# Patient Record
Sex: FEMALE | Race: WHITE
[De-identification: names, ages, dates, MRNs, and addresses within clinical notes are randomized per-mention and may not be internally consistent; named-entity substitution may affect disease eponyms.]

---

## 2020-03-24 ENCOUNTER — HOSPITAL ENCOUNTER (INPATIENT)
Dept: HOSPITAL 62 - ER | Age: 70
LOS: 4 days | Discharge: HOME | DRG: 310 | End: 2020-03-28
Attending: INTERNAL MEDICINE | Admitting: INTERNAL MEDICINE
Payer: MEDICARE

## 2020-03-24 DIAGNOSIS — Z79.01: ICD-10-CM

## 2020-03-24 DIAGNOSIS — K21.9: ICD-10-CM

## 2020-03-24 DIAGNOSIS — Z79.899: ICD-10-CM

## 2020-03-24 DIAGNOSIS — F41.0: ICD-10-CM

## 2020-03-24 DIAGNOSIS — Z84.89: ICD-10-CM

## 2020-03-24 DIAGNOSIS — F32.9: ICD-10-CM

## 2020-03-24 DIAGNOSIS — Z82.49: ICD-10-CM

## 2020-03-24 DIAGNOSIS — R11.0: ICD-10-CM

## 2020-03-24 DIAGNOSIS — Z87.891: ICD-10-CM

## 2020-03-24 DIAGNOSIS — I10: ICD-10-CM

## 2020-03-24 DIAGNOSIS — E78.5: ICD-10-CM

## 2020-03-24 DIAGNOSIS — I48.0: Primary | ICD-10-CM

## 2020-03-24 DIAGNOSIS — E05.90: ICD-10-CM

## 2020-03-24 DIAGNOSIS — F41.1: ICD-10-CM

## 2020-03-24 LAB
ADD MANUAL DIFF: NO
ALBUMIN SERPL-MCNC: 4.6 G/DL (ref 3.5–5)
ALP SERPL-CCNC: 79 U/L (ref 38–126)
ANION GAP SERPL CALC-SCNC: 13 MMOL/L (ref 5–19)
APPEARANCE UR: CLEAR
APTT PPP: (no result) S
AST SERPL-CCNC: 24 U/L (ref 14–36)
BASOPHILS # BLD AUTO: 0 10^3/UL (ref 0–0.2)
BASOPHILS NFR BLD AUTO: 0.5 % (ref 0–2)
BILIRUB DIRECT SERPL-MCNC: 0.2 MG/DL (ref 0–0.4)
BILIRUB SERPL-MCNC: 0.5 MG/DL (ref 0.2–1.3)
BILIRUB UR QL STRIP: NEGATIVE
BUN SERPL-MCNC: 7 MG/DL (ref 7–20)
CALCIUM: 10.1 MG/DL (ref 8.4–10.2)
CHLORIDE SERPL-SCNC: 98 MMOL/L (ref 98–107)
CK MB SERPL-MCNC: 1.11 NG/ML (ref ?–4.55)
CK SERPL-CCNC: 59 U/L (ref 30–135)
CO2 SERPL-SCNC: 25 MMOL/L (ref 22–30)
EOSINOPHIL # BLD AUTO: 0.1 10^3/UL (ref 0–0.6)
EOSINOPHIL NFR BLD AUTO: 1.4 % (ref 0–6)
ERYTHROCYTE [DISTWIDTH] IN BLOOD BY AUTOMATED COUNT: 14.1 % (ref 11.5–14)
GLUCOSE SERPL-MCNC: 137 MG/DL (ref 75–110)
GLUCOSE UR STRIP-MCNC: NEGATIVE MG/DL
HCT VFR BLD CALC: 43.6 % (ref 36–47)
HGB BLD-MCNC: 14.9 G/DL (ref 12–15.5)
INR PPP: 0.95
KETONES UR STRIP-MCNC: 20 MG/DL
LYMPHOCYTES # BLD AUTO: 3.2 10^3/UL (ref 0.5–4.7)
LYMPHOCYTES NFR BLD AUTO: 34.4 % (ref 13–45)
MCH RBC QN AUTO: 32.2 PG (ref 27–33.4)
MCHC RBC AUTO-ENTMCNC: 34.1 G/DL (ref 32–36)
MCV RBC AUTO: 95 FL (ref 80–97)
MONOCYTES # BLD AUTO: 0.6 10^3/UL (ref 0.1–1.4)
MONOCYTES NFR BLD AUTO: 6.9 % (ref 3–13)
NEUTROPHILS # BLD AUTO: 5.3 10^3/UL (ref 1.7–8.2)
NEUTS SEG NFR BLD AUTO: 56.8 % (ref 42–78)
NITRITE UR QL STRIP: NEGATIVE
NT PRO BNP: 1290 PG/ML (ref ?–125)
PH UR STRIP: 7 [PH] (ref 5–9)
PLATELET # BLD: 363 10^3/UL (ref 150–450)
POTASSIUM SERPL-SCNC: 4.1 MMOL/L (ref 3.6–5)
PROT SERPL-MCNC: 8 G/DL (ref 6.3–8.2)
PROT UR STRIP-MCNC: NEGATIVE MG/DL
PROTHROMBIN TIME: 12.7 SEC (ref 11.4–15.4)
RBC # BLD AUTO: 4.61 10^6/UL (ref 3.72–5.28)
SP GR UR STRIP: 1
TOTAL CELLS COUNTED % (AUTO): 100 %
TROPONIN I SERPL-MCNC: 0.01 NG/ML
UROBILINOGEN UR-MCNC: NEGATIVE MG/DL (ref ?–2)
WBC # BLD AUTO: 9.3 10^3/UL (ref 4–10.5)

## 2020-03-24 PROCEDURE — 82550 ASSAY OF CK (CPK): CPT

## 2020-03-24 PROCEDURE — 84439 ASSAY OF FREE THYROXINE: CPT

## 2020-03-24 PROCEDURE — 99292 CRITICAL CARE ADDL 30 MIN: CPT

## 2020-03-24 PROCEDURE — 84481 FREE ASSAY (FT-3): CPT

## 2020-03-24 PROCEDURE — 36415 COLL VENOUS BLD VENIPUNCTURE: CPT

## 2020-03-24 PROCEDURE — G0378 HOSPITAL OBSERVATION PER HR: HCPCS

## 2020-03-24 PROCEDURE — 83880 ASSAY OF NATRIURETIC PEPTIDE: CPT

## 2020-03-24 PROCEDURE — 93306 TTE W/DOPPLER COMPLETE: CPT

## 2020-03-24 PROCEDURE — 84484 ASSAY OF TROPONIN QUANT: CPT

## 2020-03-24 PROCEDURE — 93010 ELECTROCARDIOGRAM REPORT: CPT

## 2020-03-24 PROCEDURE — 82553 CREATINE MB FRACTION: CPT

## 2020-03-24 PROCEDURE — 80053 COMPREHEN METABOLIC PANEL: CPT

## 2020-03-24 PROCEDURE — 71045 X-RAY EXAM CHEST 1 VIEW: CPT

## 2020-03-24 PROCEDURE — 84443 ASSAY THYROID STIM HORMONE: CPT

## 2020-03-24 PROCEDURE — 99291 CRITICAL CARE FIRST HOUR: CPT

## 2020-03-24 PROCEDURE — 85025 COMPLETE CBC W/AUTO DIFF WBC: CPT

## 2020-03-24 PROCEDURE — 83735 ASSAY OF MAGNESIUM: CPT

## 2020-03-24 PROCEDURE — 96375 TX/PRO/DX INJ NEW DRUG ADDON: CPT

## 2020-03-24 PROCEDURE — 93005 ELECTROCARDIOGRAM TRACING: CPT

## 2020-03-24 PROCEDURE — 96365 THER/PROPH/DIAG IV INF INIT: CPT

## 2020-03-24 PROCEDURE — 81001 URINALYSIS AUTO W/SCOPE: CPT

## 2020-03-24 PROCEDURE — 85610 PROTHROMBIN TIME: CPT

## 2020-03-24 PROCEDURE — 96376 TX/PRO/DX INJ SAME DRUG ADON: CPT

## 2020-03-24 RX ADMIN — Medication ONE MLS/HR: at 20:32

## 2020-03-24 RX ADMIN — Medication ONE: at 20:48

## 2020-03-24 RX ADMIN — SODIUM CHLORIDE PRN MLS/HR: 9 INJECTION, SOLUTION INTRAVENOUS at 21:04

## 2020-03-24 RX ADMIN — HEPARIN SODIUM SCH UNIT: 5000 INJECTION, SOLUTION INTRAVENOUS; SUBCUTANEOUS at 22:10

## 2020-03-24 RX ADMIN — Medication PRN MLS/HR: at 22:25

## 2020-03-24 RX ADMIN — SODIUM CHLORIDE PRN MLS/HR: 9 INJECTION, SOLUTION INTRAVENOUS at 22:10

## 2020-03-24 RX ADMIN — Medication ONE MLS/HR: at 20:35

## 2020-03-24 RX ADMIN — Medication ONE: at 20:34

## 2020-03-24 RX ADMIN — METOPROLOL TARTRATE PRN MG: 5 INJECTION, SOLUTION INTRAVENOUS at 22:10

## 2020-03-24 NOTE — RADIOLOGY REPORT (SQ)
EXAM DESCRIPTION:

X-RAY CHEST- One View



CLINICAL HISTORY:

Chest pain



COMPARISON:

None available



TECHNIQUE:

Single view of the chest.



FINDINGS:

There are overlying EKG leads and other objects.

Lungs appear hyperexpanded with flattening of the diaphragms.

Nodular appearing density with potential peripheral calcification

projects over the right upper lobe measuring up to 1 cm in size.

There are no discrete pneumothoraces or pleural effusions. 



The pulmonary vascularity is normal.

The cardiomediastinal silhouette is normal in size.



No suspicious lytic or blastic osseous lesions are identified.



IMPRESSION:

1. Hyperexpanded appearance is nonspecific but can be seen in the

setting of COPD.

2. Nodular appearing lesion projects over the right upper lobe.

Findings are nonspecific and attention on follow-up is

recommended.

## 2020-03-24 NOTE — ER DOCUMENT REPORT
ED General





- General


Chief Complaint: Palpitations


Stated Complaint: CHEST PAIN,RAPID HEART BEAT,DIZZINESS


Time Seen by Provider: 03/24/20 19:57


Primary Care Provider: 


AVRIL LIM [NO LOCAL MD] - Follow up as needed


Mode of Arrival: Ambulatory


Information source: Patient


Notes: 





69-year-old  female arrives by POV with her  Doug with chief 

complaint of having onset of fast heart rate since 0800 this morning.  Patient 

was doing normal activities when this occurred.  Patient has a history of MVP 

and on atenolol per Dr. Restrepo and had surgery to her right back and neck from a

cervical rib resection in 1980.  Patient lives in [facility].  She 

has never had any similar symptoms in the past.  She has a history of some 

stomach pain when she takes aspirin and sulfa drugs and codeine.  She does not 

have allergic reactions when she takes these medicines.  Patient has a history 

of ischemic colitis in the past.  But no surgeries.  I spoke with Dr. Jaquez 

after the patient was given adenosine 6 then 12 mg and then Cardizem 20 mg bolus

with a 5 mg/h drip .


TRAVEL OUTSIDE OF THE [place] IN LAST 30 DAYS: No





- HPI


Onset: This morning


Onset/Duration: Sudden


Quality of pain: No pain


Severity: None


Pain Level: Denies


Associated symptoms: Chest pain, Weakness


Exacerbated by: Denies


Relieved by: Denies


Similar symptoms previously: Yes


Recently seen / treated by doctor: No





- Related Data


Allergies/Adverse Reactions: 


                                        





aspirin [Aspirin] Adverse Reaction (Severe, Verified 03/31/19 07:14)


   ABDOMINAL PAIN


codeine [Codeine] Adverse Reaction (Severe, Verified 03/31/19 07:14)


   ABDOMINAL PAIN


sulfamethoxazole [From Septra] Adverse Reaction (Severe, Verified 03/31/19 

07:14)


   ABDOMINAL PAIN


trimethoprim [From Septra] Adverse Reaction (Severe, Verified 03/31/19 07:14)


   ABDOMINAL PAIN








Home Medications: atenolol





Past Medical History





- Social History


Smoking Status: Unknown if Ever Smoked


Cigarette use (# per day): No


Chew tobacco use (# tins/day): No


Smoking Education Provided: No


Frequency of alcohol use: None


Drug Abuse: None


Lives with: Family -  Doug


Family History: Reviewed & Not Pertinent


Patient has suicidal ideation: No


Patient has homicidal ideation: No





- Past Medical History


Cardiac Medical History: Reports: Hx Hypercholesterolemia, Hx Hypertension


   Denies: Hx Coronary Artery Disease, Hx Heart Attack


Pulmonary Medical History: 


   Denies: Hx Asthma, Hx Bronchitis, Hx COPD, Hx Pneumonia


Neurological Medical History: Denies: Hx Cerebrovascular Accident, Hx Seizures


Renal/ Medical History: Denies: Hx Peritoneal Dialysis


GI Medical History: Reports: Hx Gastroesophageal Reflux Disease


Musculoskeletal Medical History: Denies Hx Arthritis


Past Surgical History: Reports: Hx Hysterectomy





- Immunizations


Hx Diphtheria, Pertussis, Tetanus Vaccination: Yes





Review of Systems





- Review of Systems


Constitutional: See HPI, Malaise, Weakness


EENT: No symptoms reported


Cardiovascular: See HPI, Chest pain, Palpitations


Respiratory: No symptoms reported


Gastrointestinal: No symptoms reported


Genitourinary: No symptoms reported


Female Genitourinary: No symptoms reported


Musculoskeletal: No symptoms reported


Skin: No symptoms reported


Hematologic/Lymphatic: No symptoms reported


Neurological/Psychological: No symptoms reported





Physical Exam





- Vital signs


Vitals: 





                                        











Resp Pulse Ox


 


 21 H  100 


 


 03/24/20 19:51  03/24/20 19:51











Interpretation: Hypertensive, Tachycardic





- General


General appearance: Alert





- HEENT


Head: Normocephalic


Eyes: Normal


Conjunctiva: Normal


Cornea: Normal


Extraocular movements intact: Yes


Eyelashes: Normal


Pupils: PERRL


Pharynx: Normal


Neck: Normal





- Respiratory


Respiratory status: No respiratory distress


Chest status: Nontender


Breath sounds: Normal


Chest palpation: Normal





- Cardiovascular


Rhythm: Tachycardia


Heart sounds: Normal auscultation


Murmur: No


Friction rub: No


Hamman's crunch: No





- Abdominal


Inspection: Normal


Distension: No distension


Bowel sounds: Normal


Tenderness: Nontender


Organomegaly: No organomegaly





- Back


Back: Normal





- Extremities


General upper extremity: Normal inspection


General lower extremity: Normal inspection





- Neurological


Neuro grossly intact: Yes


Cognition: Normal


Orientation: AAOx4


Nate Coma Scale Eye Opening: Spontaneous


Alger Coma Scale Verbal: Oriented


Alger Coma Scale Motor: Obeys Commands


Alger Coma Scale Total: 15


Speech: Normal


Cranial nerves: Normal


Cerebellar coordination: Normal


Motor strength normal: LUE, RUE, LLE, RLE





- Psychological


Associated symptoms: Normal affect





- Skin


Skin Temperature: Warm


Skin Moisture: Dry





Course





- Vital Signs


Vital signs: 





                                        











Temp Pulse Resp BP Pulse Ox


 


 98.5 F      25 H  153/107 H  99 


 


 03/24/20 19:52     03/24/20 19:52  03/24/20 19:52  03/24/20 19:52














- Laboratory


Result Diagrams: 


                                 03/24/20 19:53





                                 03/24/20 19:53


Laboratory results interpreted by me: 





                                        











  03/24/20 03/24/20 03/24/20





  19:53 19:53 19:53


 


RDW  14.1 H  


 


Sodium   135.8 L 


 


Glucose   137 H 


 


NT-Pro-B Natriuret Pep    1290 H














- Diagnostic Test


Radiology reviewed: Reports reviewed





- EKG Interpretation by Me


Rate: Tachycardia


Rhythm: SVT





Critical Care Note





- Critical Care Note


Total time excluding time spent on procedures (mins): 90


Comments: 





I discussed this case with [facility] and he will admit patient I 

also spoke with Dr. Jaquez prior to this time and he advised admitting the 

patient and he will see the patient tomorrow morning.





Discharge





- Discharge


Clinical Impression: 


 Tachycardia





Condition: Good


Disposition: ADMITTED AS INPATIENT


Unit Admitted: ICU


Additional Instructions: 


Transfer patient to ICU


Referrals: 


AVRIL LIM [NO LOCAL MD] - Follow up as needed

## 2020-03-25 LAB
FREE T4 (FREE THYROXINE): 1.08 NG/DL (ref 0.78–2.19)
T3FREE SERPL-MCNC: 5.24 PG/ML (ref 2.77–5.27)

## 2020-03-25 RX ADMIN — BUSPIRONE HYDROCHLORIDE SCH: 10 TABLET ORAL at 14:35

## 2020-03-25 RX ADMIN — BUSPIRONE HYDROCHLORIDE SCH MG: 10 TABLET ORAL at 14:07

## 2020-03-25 RX ADMIN — HEPARIN SODIUM SCH UNIT: 5000 INJECTION, SOLUTION INTRAVENOUS; SUBCUTANEOUS at 06:18

## 2020-03-25 RX ADMIN — DILTIAZEM HYDROCHLORIDE SCH MG: 60 TABLET, FILM COATED ORAL at 12:27

## 2020-03-25 RX ADMIN — Medication PRN MLS/HR: at 14:10

## 2020-03-25 RX ADMIN — APIXABAN SCH MG: 5 TABLET, FILM COATED ORAL at 18:05

## 2020-03-25 RX ADMIN — BUSPIRONE HYDROCHLORIDE SCH MG: 10 TABLET ORAL at 14:39

## 2020-03-25 RX ADMIN — DILTIAZEM HYDROCHLORIDE SCH MG: 60 TABLET, FILM COATED ORAL at 18:06

## 2020-03-25 RX ADMIN — Medication PRN MLS/HR: at 22:47

## 2020-03-25 RX ADMIN — DILTIAZEM HYDROCHLORIDE SCH MG: 60 TABLET, FILM COATED ORAL at 23:11

## 2020-03-25 RX ADMIN — ZOLPIDEM TARTRATE PRN MG: 5 TABLET ORAL at 22:40

## 2020-03-25 RX ADMIN — DILTIAZEM HYDROCHLORIDE SCH MG: 60 TABLET, FILM COATED ORAL at 08:56

## 2020-03-25 RX ADMIN — METOPROLOL TARTRATE SCH MG: 25 TABLET, FILM COATED ORAL at 14:06

## 2020-03-25 RX ADMIN — Medication PRN MLS/HR: at 04:14

## 2020-03-25 RX ADMIN — BUSPIRONE HYDROCHLORIDE SCH MG: 10 TABLET ORAL at 22:40

## 2020-03-25 RX ADMIN — DEXAMETHASONE SODIUM PHOSPHATE PRN MG: 10 INJECTION INTRAMUSCULAR; INTRAVENOUS at 18:10

## 2020-03-25 RX ADMIN — METOPROLOL TARTRATE PRN MG: 5 INJECTION, SOLUTION INTRAVENOUS at 20:39

## 2020-03-25 RX ADMIN — METOPROLOL TARTRATE SCH MG: 25 TABLET, FILM COATED ORAL at 22:40

## 2020-03-25 RX ADMIN — BUTALBITAL, ACETAMINOPHEN AND CAFFEINE PRN TAB: 50; 325; 40 TABLET ORAL at 18:10

## 2020-03-25 RX ADMIN — DEXAMETHASONE SODIUM PHOSPHATE PRN MG: 10 INJECTION INTRAMUSCULAR; INTRAVENOUS at 10:28

## 2020-03-25 RX ADMIN — BUTALBITAL, ACETAMINOPHEN AND CAFFEINE PRN TAB: 50; 325; 40 TABLET ORAL at 10:28

## 2020-03-25 NOTE — EKG REPORT
SEVERITY:- ABNORMAL ECG -

ATRIAL FIBRILLATION

PVC

LEFT AXIS DEVIATION

:

Confirmed by: Santino Singh MD 25-Mar-2020 11:25:05

## 2020-03-25 NOTE — EKG REPORT
SEVERITY:- ABNORMAL ECG -

ATRIAL FIBRILLATION WITH RAPID V-RATE

INFERIOR INFARCT, OLD

REPOLARIZATION ABNORMALITY, PROB RATE RELATED

:

Confirmed by: Santino Singh MD 25-Mar-2020 11:26:17

## 2020-03-25 NOTE — PDOC H&P
History of Present Illness


Admission Date/PCP: 


  20 21:25





  





Patient complains of: Palpitations


History of Present Illness: 


YOANA VILLA is a 69 year old female with a past medical history of mitral valve

prolapse and intermittent tachycardia on atenolol.  She presents with 12 hours 

of palpitations prompting evaluation in the emergency department.  She is found 

to have sinus tachycardia in the 1 40-1 80 range, after adenosine 6 followed by 

12 she was placed on IV Cardizem.  And referred to the hospitalist for 

admission.  She adamantly denies recent change in medications, excessive 

caffeine, energy drinks, over-the-counter medications or abrupt medication 

withdrawal.  She does admit to recent onset of panic attack first being 2 weeks 

ago prior to dental procedure.  She admits to panic this morning concern for 

repeat dental procedure and ongoing public health concerns.   She also has a 

history of thyroid nodules requiring drainage 2 years ago.  After receiving 

continuous IV Cardizem followed by IV labetalol and Lopressor without 

significant improvement she receives IV Ativan greatly improving her heart rate 

into the 90s.  She denies chest pain nausea vomiting, heat or cold intolerance.





Past Medical History


Cardiac Medical History: Reports: Hyperlipidema, Hypertension


   Denies: Coronary Artery Disease, Myocardial Infarction


Pulmonary Medical History: 


   Denies: Asthma, Bronchitis, Chronic Obstructive Pulmonary Disease (COPD), 

Pneumonia


Neurological Medical History: 


   Denies: Seizures


Endocrine Medical History: Reports: Hyperthyroidism


GI Medical History: Reports: Gastroesophageal Reflux Disease


Musculoskeltal Medical History: 


   Denies: Arthritis


Psychiatric Medical History: Reports: Depression, General Anxiety Disorder


Hematology: 


   Denies: Anemia





Past Surgical History


Past Surgical History: Reports: Hysterectomy





Social History


Information Source: Patient


Lives with: Family -  Doug


Smoking Status: Former Smoker


Cigarettes Packs Per Day: 0.5


Number of Years Smokin


Last Time Smoked: 


Frequency of Alcohol Use: None


Hx Recreational Drug Use: No


Drugs: None


Hx Prescription Drug Abuse: No





- Advance Directive


Resuscitation Status: Full Code





Family History


Family History: Hypertension, Thyroid Disfunction


Parental Family History Reviewed: Yes


Children Family History Reviewed: Yes


Sibling(s) Family History Reviewed.: Yes





Medication/Allergy


Home Medications: 








Atenolol [Tenormin 25 mg Tablet] 25 mg PO DAILY 13 


Butalb/Acetaminophen/Caffeine [Fioricet (-40 mg) Tablet] 1 tab PO BID 

13 


Zolpidem Tartrate [Ambien 10 mg Tablet] 10 mg PO PRN PRN 13 


Ondansetron [Zofran Odt 4 mg Tablet] 1 - 2 tab PO Q4H PRN #15 tab.rapdis 

19 


Pravastatin Sodium 20 mg PO 20 








Allergies/Adverse Reactions: 


                                        





aspirin [Aspirin] Adverse Reaction (Severe, Verified 19 07:14)


   ABDOMINAL PAIN


codeine [Codeine] Adverse Reaction (Severe, Verified 19 07:14)


   ABDOMINAL PAIN


sulfamethoxazole [From Septra] Adverse Reaction (Severe, Verified 19 

07:14)


   ABDOMINAL PAIN


trimethoprim [From Septra] Adverse Reaction (Severe, Verified 19 07:14)


   ABDOMINAL PAIN











Review of Systems


Constitutional: ABSENT: chills, fever(s), headache(s), weight gain, weight loss


Eyes: ABSENT: visual disturbances


Ears: ABSENT: hearing changes


Cardiovascular: ABSENT: chest pain, dyspnea on exertion, edema, orthropnea, 

palpitations


Respiratory: ABSENT: cough, hemoptysis


Gastrointestinal: ABSENT: abdominal pain, constipation, diarrhea, hematemesis, 

hematochezia, nausea, vomiting


Genitourinary: ABSENT: dysuria, hematuria


Musculoskeletal: ABSENT: joint swelling


Integumentary: ABSENT: rash, wounds


Neurological: ABSENT: abnormal gait, abnormal speech, confusion, dizziness, 

focal weakness, syncope


Psychiatric: ABSENT: anxiety, depression, homidical ideation, suicidal ideation


Endocrine: ABSENT: cold intolerance, heat intolerance, polydipsia, polyuria


Hematologic/Lymphatic: ABSENT: easy bleeding, easy bruising





Physical Exam


Vital Signs: 


                                        











Temp Pulse Resp BP Pulse Ox


 


 98 F   118 H  18   115/71   99 


 


 20 23:56  20 23:56  20 23:56  20 02:00  20 23:56








                                 Intake & Output











 20





 11:59 11:59 11:59


 


Intake Total   


 


Balance   


 


Weight   60.5 kg











General appearance: PRESENT: no acute distress, well-developed, well-nourished


Head exam: PRESENT: atraumatic, normocephalic


Eye exam: PRESENT: conjunctiva pink, EOMI, PERRLA.  ABSENT: scleral icterus


Ear exam: PRESENT: normal external ear exam


Mouth exam: PRESENT: moist, tongue midline


Neck exam: ABSENT: carotid bruit, JVD, lymphadenopathy, thyromegaly


Respiratory exam: PRESENT: clear to auscultation geovanni.  ABSENT: rales, rhonchi, 

wheezes


Cardiovascular exam: PRESENT: irregular rhythm, tachycardia.  ABSENT: diastolic 

murmur, rubs, systolic murmur


Pulses: PRESENT: normal dorsalis pedis pul


Vascular exam: PRESENT: normal capillary refill


GI/Abdominal exam: PRESENT: normal bowel sounds, soft.  ABSENT: distended, 

guarding, mass, organolmegaly, rebound, tenderness


Rectal exam: PRESENT: deferred


Extremities exam: PRESENT: full ROM.  ABSENT: calf tenderness, clubbing, pedal 

edema


Neurological exam: PRESENT: alert, awake, oriented to person, oriented to place,

oriented to time, oriented to situation, CN II-XII grossly intact.  ABSENT: 

motor sensory deficit


Psychiatric exam: PRESENT: appropriate affect, normal mood.  ABSENT: homicidal 

ideation, suicidal ideation


Skin exam: PRESENT: dry, intact, warm.  ABSENT: cyanosis, rash





Results


Laboratory Results: 


                                        





                                 20 19:53 





                                 20 19:53 





                                        











  20





  19:53 19:53 19:53


 


WBC  9.3  


 


RBC  4.61  


 


Hgb  14.9  


 


Hct  43.6  


 


MCV  95  


 


MCH  32.2  


 


MCHC  34.1  


 


RDW  14.1 H  


 


Plt Count  363  


 


Seg Neutrophils %  56.8  


 


Sodium   135.8 L 


 


Potassium   4.1 


 


Chloride   98 


 


Carbon Dioxide   25 


 


Anion Gap   13 


 


BUN   7 


 


Creatinine   0.72 


 


Est GFR ( Amer)   > 60 


 


Glucose   137 H 


 


Calcium   10.1 


 


Magnesium    2.1


 


Total Bilirubin   0.5 


 


AST   24 


 


Alkaline Phosphatase   79 


 


Total Protein   8.0 


 


Albumin   4.6 


 


TSH   


 


Urine Color   


 


Urine Appearance   


 


Urine pH   


 


Ur Specific Gravity   


 


Urine Protein   


 


Urine Glucose (UA)   


 


Urine Ketones   


 


Urine Blood   


 


Urine Nitrite   


 


Ur Leukocyte Esterase   


 


Urine WBC (Auto)   














  20





  19:53 21:19


 


WBC  


 


RBC  


 


Hgb  


 


Hct  


 


MCV  


 


MCH  


 


MCHC  


 


RDW  


 


Plt Count  


 


Seg Neutrophils %  


 


Sodium  


 


Potassium  


 


Chloride  


 


Carbon Dioxide  


 


Anion Gap  


 


BUN  


 


Creatinine  


 


Est GFR (African Amer)  


 


Glucose  


 


Calcium  


 


Magnesium  


 


Total Bilirubin  


 


AST  


 


Alkaline Phosphatase  


 


Total Protein  


 


Albumin  


 


TSH  0.53 


 


Urine Color   STRAW


 


Urine Appearance   CLEAR


 


Urine pH   7.0


 


Ur Specific Gravity   1.004


 


Urine Protein   NEGATIVE


 


Urine Glucose (UA)   NEGATIVE


 


Urine Ketones   20 H


 


Urine Blood   NEGATIVE


 


Urine Nitrite   NEGATIVE


 


Ur Leukocyte Esterase   NEGATIVE


 


Urine WBC (Auto)   1








                                        











  20





  19:53 19:53


 


Creatine Kinase  59 


 


CK-MB (CK-2)   1.11


 


Troponin I   0.014


 


NT-Pro-B Natriuret Pep   1290 H











Impressions: 


                                        





Chest X-Ray  20 19:59


IMPRESSION:


1. Hyperexpanded appearance is nonspecific but can be seen in the


setting of COPD.


2. Nodular appearing lesion projects over the right upper lobe.


Findings are nonspecific and attention on follow-up is


recommended.


 














Assessment and Plan





- Diagnosis


(1) A-fib


Qualifiers: 


   Atrial fibrillation type: paroxysmal   Qualified Code(s): I48.0 - Paroxysmal 

atrial fibrillation   


Is this a current diagnosis for this admission?: Yes   


Plan: 


[facility], IV Cardizem, beta-blocker as tolerated.  Possibly secondary to 

hypothyroidism, full dose anticoagulation, follow-up cardiology consult and TSH








(2) Hyperthyroidism


Is this a current diagnosis for this admission?: Yes   


Plan: 


Unclear history, follow-up TSH and free T4








(3) Panic


Is this a current diagnosis for this admission?: Yes   


Plan: 


Second episode in 2 weeks, possibly secondary to hyperthyroidism, symptomatic 

management, beta-blocker ordered.








- Time


Time Spent with patient: 25-34 minutes





- Inpatient Certification


Medical Necessity: Need Close Monitoring Due to Risk of Patient Decompensation

## 2020-03-25 NOTE — XCELERA REPORT
[address] [address]

                               Tel: [phone]

                               Fax: [phone]



                      Transthoracic Echocardiogram Report

_______________________________________________________________________________



Name: YOANA VILLA

MRN: [id number]                           Age: 69 yrs

Gender: Female                            : 1950

Patient Status: Inpatient                 Patient Location: [address] [place], [place] [address]

Account #: [id number]

Study Date: 2020 07:12 AM

History: Atrial fibrillation

Accession #: [id number]

_______________________________________________________________________________



Height: 67 in        Weight: 140 lb        BSA: 1.7 m2

_______________________________________________________________________________

Procedure: A complete two-dimensional transthoracic echocardiogram was

performed (2D, M-mode, spectral and color flow Doppler). The study was

technically adequate with some images being suboptimal in quality.

Reason For Study: new onset afib (marcin stephanie)

Previous Evaluation: No previous studies were available.

History: Atrial fibrillation.



Ordering Physician: ADÁN AGRAWAL

Performed By: Lisa Mejia

_______________________________________________________________________________



Interpretation Summary

The Ejection Fraction estimate is 60-65%

Left ventricular systolic function is normal.

The right ventricle is normal in size and function.

There is a trace amount of mitral regurgitation

There is no aortic valve stenosis

There is a mild amount of tricuspid regurgitation

There is no pericardial effusion.



MMode/2D Measurements & Calculations



RVDd: 2.2 cm        LVIDd: 3.7 cm      FS: 28.5 %         Ao root diam: 2.8 cm

IVSd: 1.5 cm        LVIDs: 2.6 cm      EDV(Teich):        Ao root area:

                    LVPWd: 0.92 cm     56.8 ml            6.3 cm2

                                       ESV(Teich):

                                       25.1 ml

                                       EF(Teich): 55.9 %

        _______________________________________________________________

EDV(MOD-sp4):       SV(MOD-sp4):

32.5 ml             21.3 ml

ESV(MOD-sp4):

11.3 ml

EF(MOD-sp4): 65.4 %



Doppler Measurements & Calculations

MV E max aashish:       MV dec slope:        Ao V2 max:         LV V1 max P.9 cm/sec                              185.5 cm/sec       3.6 mmHg

                    370.2 cm/sec2        Ao max PG:         LV V1 max:

                    MV dec time: 0.23 sec13.8 mmHg          94.5 cm/sec



        _______________________________________________________________

PA V2 max:          TR max aashish:

95.6 cm/sec         263.1 cm/sec

PA max PG: 3.7 mmHg TR max P.7 mmHg



Left Ventricle

The left ventricle is grossly normal size. There is normal left ventricular

wall thickness. The Ejection Fraction estimate is 60-65%. Left ventricular

systolic function is normal. No regional wall motion abnormalities noted.



Right Ventricle

The right ventricle is normal in size and function.





Atria

The right atrium is normal. The left atrial size is normal.



Mitral Valve

The mitral valve leaflets appear normal. There is no evidence of stenosis,

fluttering, or prolapse. There is no evidence of mitral valve prolapse. There

is a trace amount of mitral regurgitation.



Aortic Valve

The aortic valve opens well. The aortic valve is trileaflet. The aortic valve

is normal in structure and function. There is no aortic valve stenosis. No

aortic regurgitation is present.



Tricuspid Valve

The tricuspid valve is normal in structure and function. There is a mild

amount of tricuspid regurgitation. Doppler findings do not suggest pulmonary

hypertension.





Pulmonic Valve

The pulmonic valve is normal in structure and function. There is a trace

amount of pulmonic regurgitation.



Effusions

There is no pericardial effusion.



_______________________________________________________________________________



_______________________________________________________________________________

Electronically signed by:      Marcin Jaquez      on 2020 01:34 PM



CC: ADÁN AGRAWAL Anil

## 2020-03-25 NOTE — PDOC CONSULTATION
Consultation


Consult Date: 20


Attending physician:: ADÁN AGRAWAL


Provider Consulted: SMITA PAIZ


Consult reason:: Atrial fibrillation





History of Present Illness


Admission Date/PCP: 


  20 21:25





  





Patient complains of: Palpitations


History of Present Illness: 


YOANA VILLA is a 69 year old female


with ? MVP by Echo( remote) with intermittent "tachycardia". She presented with 

palpitartions to ED yesterday. Likely atrial fibrillation with rapid ventricular

response; received IV Adenosine ( no significant effect) and then required IV Di

ltiazem with better rate control.  No report of CHF, DM , Stroke or TIA or 

systemic hypertension.





No previous diagnosis of atrial fibrillation.





Denies tobacco. Retired RN. No major surgeries. 





Past Medical History


Cardiac Medical History: Reports: Hyperlipidema, Hypertension


   Denies: Coronary Artery Disease, Myocardial Infarction


Pulmonary Medical History: 


   Denies: Asthma, Bronchitis, Chronic Obstructive Pulmonary Disease (COPD), 

Pneumonia


Neurological Medical History: 


   Denies: Seizures


Endocrine Medical History: Reports: Hyperthyroidism


GI Medical History: Reports: Gastroesophageal Reflux Disease


Musculoskeltal Medical History: 


   Denies: Arthritis


Psychiatric Medical History: Reports: Depression, General Anxiety Disorder


Hematology: 


   Denies: Anemia





Past Surgical History


Past Surgical History: Reports: Hysterectomy





Social History


Lives with: Family -  Doug


Smoking Status: Former Smoker


Cigarettes Packs Per Day: 0.5


Number of Years Smokin


Last Time Smoked: 


Frequency of Alcohol Use: None


Hx Recreational Drug Use: No


Drugs: None


Hx Prescription Drug Abuse: No





- Advance Directive


Resuscitation Status: Full Code





Family History


Family History: Hypertension, Thyroid Disfunction


Parental Family History Reviewed: Yes - No familial illnesses


Children Family History Reviewed: NA


Sibling(s) Family History Reviewed.: NA





Medication/Allergy


Home Medications: 








Atenolol [Tenormin 25 mg Tablet] 25 mg PO QPM 13 


Butalb/Acetaminophen/Caffeine [Fioricet (-40 mg) Tablet] 1 tab PO DAILYP 

PRN 13 


Zolpidem Tartrate [Ambien 10 mg Tablet] 10 mg PO HSP PRN 13 


Cetirizine HCl [Zyrtec 10 mg Tablet] 10 mg PO DAILYP PRN 20 


Ondansetron [Zofran Odt 4 mg Tablet] 4 mg PO Q4HP PRN 20 


Pravastatin Sodium 20 mg PO QPM 20 


Rabeprazole Sodium [Aciphex] 20 mg PO QPMP PRN 20 








Allergies/Adverse Reactions: 


                                        





aspirin [Aspirin] Adverse Reaction (Severe, Verified 19 07:14)


   ABDOMINAL PAIN


codeine [Codeine] Adverse Reaction (Severe, Verified 19 07:14)


   ABDOMINAL PAIN


sulfamethoxazole [From Septra] Adverse Reaction (Severe, Verified 19 

07:14)


   ABDOMINAL PAIN


trimethoprim [From Septra] Adverse Reaction (Severe, Verified 19 07:14)


   ABDOMINAL PAIN











Review of Systems


Constitutional: PRESENT: as per HPI


Ears: PRESENT: as per HPI


Nose, Mouth, and Throat: PRESENT: as per HPI


Cardiovascular: PRESENT: palpitations


Respiratory: ABSENT: as per HPI, cough, dyspnea, hemoptysis, sputum, other


Gastrointestinal: ABSENT: as per HPI, abdominal pain, bloating, coffee ground 

emesis, constipation, diarrhea, dysphagia, heartburn, hematemesis, hematochezia,

melena, nausea, vomiting, other


Neurological: ABSENT: as per HPI, abnormal gait, abnormal movements, abnormal 

speech, confusion, convulsions, dizziness, focal weakness, frequent falls, lack 

of coordination, memory loss, numbness, paresthesias, restless legs, syncope, 

tingling, tremor(s), vertigo, weakness, other





Physical Exam


Vital Signs: 


                                        











Temp Pulse Resp BP Pulse Ox


 


 98.6 F   91   16   126/67 H  98 


 


 20 11:57  20 11:57  20 11:57  20 12:00  20 11:57








                                 Intake & Output











 20





 06:59 06:59 06:59


 


Intake Total  2557 68


 


Output Total  150 


 


Balance  2407 68


 


Weight  63.7 kg 











General appearance: PRESENT: no acute distress, cooperative, well-developed, 

well-nourished


Head exam: PRESENT: atraumatic, normocephalic


Eye exam: PRESENT: conjunctiva pink, EOMI


Mouth exam: PRESENT: moist


Respiratory exam: PRESENT: clear to auscultation geovanni, symmetrical, unlabored


Cardiovascular exam: PRESENT: irregular rhythm, RRR, +S1, +S2, tachycardia


Pulses: PRESENT: normal radial pulses


Rectal exam: PRESENT: deferred


Musculoskeletal exam: PRESENT: normal inspection


Neurological exam: PRESENT: alert, awake, oriented to person, oriented to place,

oriented to time, oriented to situation


Psychiatric exam: PRESENT: appropriate affect


Skin exam: PRESENT: dry, intact





Results


Laboratory Results: 


                                        





                                 20 19:53 





                                 20 19:53 





                                        











  20





  19:53 19:53 19:53


 


WBC  9.3  


 


RBC  4.61  


 


Hgb  14.9  


 


Hct  43.6  


 


MCV  95  


 


MCH  32.2  


 


MCHC  34.1  


 


RDW  14.1 H  


 


Plt Count  363  


 


Seg Neutrophils %  56.8  


 


Sodium   135.8 L 


 


Potassium   4.1 


 


Chloride   98 


 


Carbon Dioxide   25 


 


Anion Gap   13 


 


BUN   7 


 


Creatinine   0.72 


 


Est GFR ( Amer)   > 60 


 


Glucose   137 H 


 


Calcium   10.1 


 


Magnesium    2.1


 


Total Bilirubin   0.5 


 


AST   24 


 


Alkaline Phosphatase   79 


 


Total Protein   8.0 


 


Albumin   4.6 


 


TSH   


 


Free T4   


 


Free T3 pg/mL   


 


Urine Color   


 


Urine Appearance   


 


Urine pH   


 


Ur Specific Gravity   


 


Urine Protein   


 


Urine Glucose (UA)   


 


Urine Ketones   


 


Urine Blood   


 


Urine Nitrite   


 


Ur Leukocyte Esterase   


 


Urine WBC (Auto)   














  20





  19:53 19:53 21:19


 


WBC   


 


RBC   


 


Hgb   


 


Hct   


 


MCV   


 


MCH   


 


MCHC   


 


RDW   


 


Plt Count   


 


Seg Neutrophils %   


 


Sodium   


 


Potassium   


 


Chloride   


 


Carbon Dioxide   


 


Anion Gap   


 


BUN   


 


Creatinine   


 


Est GFR (African Amer)   


 


Glucose   


 


Calcium   


 


Magnesium   


 


Total Bilirubin   


 


AST   


 


Alkaline Phosphatase   


 


Total Protein   


 


Albumin   


 


TSH  0.53  


 


Free T4   1.08 


 


Free T3 pg/mL   5.24 


 


Urine Color    STRAW


 


Urine Appearance    CLEAR


 


Urine pH    7.0


 


Ur Specific Gravity    1.004


 


Urine Protein    NEGATIVE


 


Urine Glucose (UA)    NEGATIVE


 


Urine Ketones    20 H


 


Urine Blood    NEGATIVE


 


Urine Nitrite    NEGATIVE


 


Ur Leukocyte Esterase    NEGATIVE


 


Urine WBC (Auto)    1








                                        











  20





  19:53 19:53


 


Creatine Kinase  59 


 


CK-MB (CK-2)   1.11


 


Troponin I   0.014


 


NT-Pro-B Natriuret Pep   1290 H











EKG Comments: 





EKG independently reviewed by me 


Also Telemetry strips


Show Atrial fibrillation with RVR


Impressions: 


                                        





Chest X-Ray  20 19:59


IMPRESSION:


1. Hyperexpanded appearance is nonspecific but can be seen in the


setting of COPD.


2. Nodular appearing lesion projects over the right upper lobe.


Findings are nonspecific and attention on follow-up is


recommended.


 











Status: Image reviewed by me - No cardiogemagaly No pulmonary edema





Assessment & Plan





- Diagnosis


(1) A-fib


Qualifiers: 


   Atrial fibrillation type: paroxysmal   Qualified Code(s): I48.0 - Paroxysmal 

atrial fibrillation   


Is this a current diagnosis for this admission?: Yes   


Plan: 


New onset atrial fibrillation with rapid ventricular response


Continue diltiazem gtt with transition to oral Diltiazem with possible addition 

of beta-blocker as well


Directly acting oral anticoagulation to be considered-female, age > 65 years


Rate control measures for now.


Can pursue out patient [facility] if necessary( given limited anesthesia/ 

personnel support)

## 2020-03-26 RX ADMIN — BUSPIRONE HYDROCHLORIDE SCH MG: 10 TABLET ORAL at 13:07

## 2020-03-26 RX ADMIN — DILTIAZEM HYDROCHLORIDE SCH MG: 60 TABLET, FILM COATED ORAL at 06:58

## 2020-03-26 RX ADMIN — DEXAMETHASONE SODIUM PHOSPHATE PRN MG: 10 INJECTION INTRAMUSCULAR; INTRAVENOUS at 03:33

## 2020-03-26 RX ADMIN — METOCLOPRAMIDE HYDROCHLORIDE PRN MG: 10 TABLET ORAL at 17:44

## 2020-03-26 RX ADMIN — BUSPIRONE HYDROCHLORIDE SCH: 10 TABLET ORAL at 06:59

## 2020-03-26 RX ADMIN — Medication PRN MLS/HR: at 22:59

## 2020-03-26 RX ADMIN — ZOLPIDEM TARTRATE PRN MG: 5 TABLET ORAL at 22:43

## 2020-03-26 RX ADMIN — DILTIAZEM HYDROCHLORIDE SCH MG: 60 TABLET, FILM COATED ORAL at 17:42

## 2020-03-26 RX ADMIN — BUSPIRONE HYDROCHLORIDE SCH MG: 10 TABLET ORAL at 22:43

## 2020-03-26 RX ADMIN — METOPROLOL TARTRATE SCH MG: 50 TABLET, FILM COATED ORAL at 13:07

## 2020-03-26 RX ADMIN — METOPROLOL TARTRATE SCH MG: 25 TABLET, FILM COATED ORAL at 06:58

## 2020-03-26 RX ADMIN — DILTIAZEM HYDROCHLORIDE SCH MG: 60 TABLET, FILM COATED ORAL at 13:07

## 2020-03-26 RX ADMIN — METOCLOPRAMIDE HYDROCHLORIDE PRN MG: 10 TABLET ORAL at 10:48

## 2020-03-26 RX ADMIN — APIXABAN SCH: 5 TABLET, FILM COATED ORAL at 09:40

## 2020-03-26 RX ADMIN — Medication PRN MLS/HR: at 07:07

## 2020-03-26 RX ADMIN — Medication PRN MLS/HR: at 15:32

## 2020-03-26 RX ADMIN — METOPROLOL TARTRATE SCH MG: 50 TABLET, FILM COATED ORAL at 22:42

## 2020-03-26 NOTE — PDOC PROGRESS REPORT
Subjective


Progress Note for:: 03/26/20


Subjective:: 





Examined.  Resting in bed.  No complaints of chest pain.  Endorses palpitations 

especially when getting up to go to the bathroom.  Continues to be in atrial 

fibrillation.  Ventricular rate is better controlled with average heart rate 

between 90 to 110 bpm.


Reason For Visit: 


AFIB WITH RVR








Physical Exam


Vital Signs: 


                                        











Temp Pulse Resp BP Pulse Ox


 


 98.8 F   89   16   95/68 L  98 


 


 03/26/20 12:01  03/26/20 15:00  03/26/20 12:01  03/26/20 15:00  03/26/20 12:01








                                 Intake & Output











 03/25/20 03/26/20 03/27/20





 06:59 06:59 06:59


 


Intake Total 2557 1657 250


 


Output Total 150 1400 


 


Balance 2407 257 250


 


Weight 63.7 kg 70.2 kg 











General appearance: PRESENT: no acute distress, cooperative, well-developed


Head exam: PRESENT: atraumatic, normocephalic


Eye exam: PRESENT: conjunctiva pink, EOMI


Mouth exam: PRESENT: moist


Respiratory exam: PRESENT: clear to auscultation geovanni, symmetrical, unlabored


Cardiovascular exam: PRESENT: irregular rhythm, +S1, +S2


GI/Abdominal exam: PRESENT: soft


Rectal exam: PRESENT: deferred


Neurological exam: PRESENT: alert, awake, oriented to person, oriented to place,

oriented to time, oriented to situation - They likely do not get get into fights

even before he was started.  Baby is very is [already


Psychiatric exam: PRESENT: appropriate affect


Skin exam: PRESENT: dry, intact, normal color





Results


Laboratory Results: 


                                        





                                 03/24/20 19:53 





                                 03/24/20 19:53 





                                        











  03/24/20 03/24/20





  19:53 19:53


 


Creatine Kinase  59 


 


CK-MB (CK-2)   1.11


 


Troponin I   0.014


 


NT-Pro-B Natriuret Pep   1290 H











EKG Comments: 





Telemetry overnight showed atrial fibrillation.  Presently ventricular rate is 

between 90 to 110 bpm.


Impressions: 


                                        





Chest X-Ray  03/24/20 19:59


IMPRESSION:


1. Hyperexpanded appearance is nonspecific but can be seen in the


setting of COPD.


2. Nodular appearing lesion projects over the right upper lobe.


Findings are nonspecific and attention on follow-up is


recommended.


 














Assessment & Plan





- Diagnosis


(1) A-fib


Qualifiers: 


   Atrial fibrillation type: paroxysmal   Qualified Code(s): I48.0 - Paroxysmal 

atrial fibrillation   


Is this a current diagnosis for this admission?: Yes   


Plan: 


Continues to be in atrial fibrillation.  Ventricular rate is better controlled.


Would recommend up titrating beta-blocker.  She is normotensive now.  For 

further rate control we may need to use digoxin.  Renal function is normal.





Echocardiogram showed preserved ejection fraction and no significant valve abno

rmality





In the present circumstance it is not possible to arrange for transesophageal 

echocardiogram and cardioversion for this patient.  Furthermore in my discussion

 with the patient she is inclined on proceeding with a dental procedure which 

has been scheduled for this coming Monday.  Thus anticoagulation will have to be

 interrupted and thus it is reasonable to only pursue rate control at the [facility].  This was discussed with the patient and she acknowledges and understands

 the situation.





Once ventricular rate is better controlled hopefully patient can be discharged 

in the next 24 hours.  I will arrange for outpatient follow-up

## 2020-03-26 NOTE — PDOC PROGRESS REPORT
Subjective


Progress Note for:: 03/26/20


Subjective:: 





Patient anxious today about persistent palpitations.  Also states that her 

nausea had not fully resolved with the Zofran.  Of note patient does have 

chronic nausea since she was diagnosed with ischemic colitis and has been taking

Zofran at home an as-needed basis usually before she eats.  Patient denies any 

chest pain.  Patient also anxious about not having a bowel movement in a day and

a half.  Reassured patient.


Reason For Visit: 


TACHYCARDIA








Physical Exam


Vital Signs: 


                                        











Temp Pulse Resp BP Pulse Ox


 


 99.1 F   112 H  16   111/61   96 


 


 03/26/20 03:23  03/26/20 07:00  03/25/20 18:05  03/26/20 08:00  03/26/20 03:23








                                 Intake & Output











 03/25/20 03/26/20 03/27/20





 06:59 06:59 06:59


 


Intake Total 2557 1657 125


 


Output Total 150 1400 


 


Balance 2407 257 125


 


Weight 63.7 kg 70.2 kg 











General appearance: PRESENT: no acute distress, cooperative


Neck exam: ABSENT: JVD


Respiratory exam: PRESENT: clear to auscultation geovanni, unlabored.  ABSENT: 

tachypnea, wheezes


Cardiovascular exam: PRESENT: irregular rhythm, +S1, +S2, tachycardia


GI/Abdominal exam: PRESENT: normal bowel sounds, soft.  ABSENT: rebound, rigid, 

tenderness


Neurological exam: PRESENT: alert, awake, oriented to person, oriented to place,

oriented to time, oriented to situation





Results


Laboratory Results: 


                                        





                                 03/24/20 19:53 





                                 03/24/20 19:53 





                                        











  03/24/20 03/24/20





  19:53 19:53


 


Creatine Kinase  59 


 


CK-MB (CK-2)   1.11


 


Troponin I   0.014


 


NT-Pro-B Natriuret Pep   1290 H











Impressions: 


                                        





Chest X-Ray  03/24/20 19:59


IMPRESSION:


1. Hyperexpanded appearance is nonspecific but can be seen in the


setting of COPD.


2. Nodular appearing lesion projects over the right upper lobe.


Findings are nonspecific and attention on follow-up is


recommended.


 














Assessment and Plan





- Diagnosis


(1) Paroxysmal atrial fibrillation with rapid ventricular response


Is this a current diagnosis for this admission?: Yes   


Plan: 


Still in RVR on diltiazem drip but persistent RVR.  Continue diltiazem 60 mg 

p.o. every 6 hours.  Lopressor dose increased to 50 mg p.o. every 8 hours


Continue to monitor vital signs and monitor telemetry.  TSH and free T4 within 

normal limits- not in hyperthyroid state.


Chadsvasc 2 --> Eliquis started but held in anticipation of patient's dental 

surgery on Monday next week


Echocardiogram is unremarkable with normal ejection fraction, no evidence of 

mitral valve prolapse.


Cardiology following








(2) Anxiety


Is this a current diagnosis for this admission?: Yes   


Plan: 


Buspirone.  Reassurance.








(3) GERD (gastroesophageal reflux disease)


Qualifiers: 


   Esophagitis presence: esophagitis presence not specified   Qualified Code(s):

K21.9 - Gastro-esophageal reflux disease without esophagitis   


Is this a current diagnosis for this admission?: Yes   


Plan: 


Protonix while inpatient.








(4) Nausea


Is this a current diagnosis for this admission?: Yes   


Plan: 


Patient has chronic nausea since she had procedure for ischemic colitis a while 

back.  Takes Zofran at home.  Will give Zofran IV and Reglan as needed.








- Time


Time Spent with patient: 15-24 minutes

## 2020-03-27 RX ADMIN — DILTIAZEM HYDROCHLORIDE SCH MG: 60 TABLET, FILM COATED ORAL at 17:27

## 2020-03-27 RX ADMIN — DILTIAZEM HYDROCHLORIDE SCH MG: 120 CAPSULE, COATED, EXTENDED RELEASE ORAL at 21:20

## 2020-03-27 RX ADMIN — METOPROLOL TARTRATE SCH MG: 50 TABLET, FILM COATED ORAL at 06:52

## 2020-03-27 RX ADMIN — METOPROLOL TARTRATE SCH MG: 50 TABLET, FILM COATED ORAL at 14:45

## 2020-03-27 RX ADMIN — Medication PRN MLS/HR: at 07:08

## 2020-03-27 RX ADMIN — METOCLOPRAMIDE HYDROCHLORIDE PRN MG: 10 TABLET ORAL at 06:52

## 2020-03-27 RX ADMIN — LORAZEPAM PRN MG: 1 TABLET ORAL at 17:28

## 2020-03-27 RX ADMIN — DILTIAZEM HYDROCHLORIDE SCH MG: 60 TABLET, FILM COATED ORAL at 11:37

## 2020-03-27 RX ADMIN — BUSPIRONE HYDROCHLORIDE SCH MG: 10 TABLET ORAL at 21:20

## 2020-03-27 RX ADMIN — METOPROLOL TARTRATE SCH MG: 50 TABLET, FILM COATED ORAL at 21:21

## 2020-03-27 RX ADMIN — ZOLPIDEM TARTRATE PRN MG: 5 TABLET ORAL at 21:21

## 2020-03-27 RX ADMIN — BUSPIRONE HYDROCHLORIDE SCH MG: 10 TABLET ORAL at 14:45

## 2020-03-27 RX ADMIN — BUSPIRONE HYDROCHLORIDE SCH MG: 10 TABLET ORAL at 06:54

## 2020-03-27 RX ADMIN — METOCLOPRAMIDE HYDROCHLORIDE PRN MG: 10 TABLET ORAL at 14:45

## 2020-03-27 RX ADMIN — DILTIAZEM HYDROCHLORIDE SCH MG: 60 TABLET, FILM COATED ORAL at 06:54

## 2020-03-27 RX ADMIN — CITALOPRAM HYDROBROMIDE SCH: 20 TABLET ORAL at 09:18

## 2020-03-27 RX ADMIN — DILTIAZEM HYDROCHLORIDE SCH MG: 60 TABLET, FILM COATED ORAL at 01:42

## 2020-03-27 NOTE — EKG REPORT
SEVERITY:- ABNORMAL ECG -

SINUS RHYTHM

PROBABLE LEFT ATRIAL ABNORMALITY

LAD, CONSIDER LAFB OR INFERIOR INFARCT

:

Confirmed by: Santino Singh MD 27-Mar-2020 19:24:09

## 2020-03-27 NOTE — PDOC PROGRESS REPORT
Subjective


Progress Note for:: 03/27/20


Subjective:: 





Patient heart rate is fine at rest however she noticed significant palpitations 

when she gets up to ambulate.  Her heart rate seems to go up to the 150s upon 

ambulation.  Otherwise patient endorses only mild nausea.  Denies abdominal pain

and feels well otherwise.


Reason For Visit: 


AFIB WITH RVR








Physical Exam


Vital Signs: 


                                        











Temp Pulse Resp BP Pulse Ox


 


 98.6 F   95   18   108/63   94 


 


 03/27/20 07:56  03/27/20 11:00  03/27/20 07:56  03/27/20 11:00  03/27/20 07:56








                                 Intake & Output











 03/26/20 03/27/20 03/28/20





 06:59 06:59 06:59


 


Intake Total 1657 1265 178


 


Output Total 1400 950 


 


Balance 257 315 178


 


Weight 70.2 kg 70 kg 











General appearance: PRESENT: no acute distress, cooperative


Neck exam: ABSENT: JVD


Respiratory exam: PRESENT: clear to auscultation geovanni, unlabored.  ABSENT: 

tachypnea, wheezes


Cardiovascular exam: PRESENT: irregular rhythm, +S1, +S2, tachycardia


GI/Abdominal exam: PRESENT: soft.  ABSENT: rebound, rigid, tenderness


Neurological exam: PRESENT: alert, awake, oriented to person, oriented to place,

oriented to time, oriented to situation





Results


Laboratory Results: 


                                        





                                 03/24/20 19:53 





                                 03/24/20 19:53 





                                        











  03/24/20 03/24/20





  19:53 19:53


 


Creatine Kinase  59 


 


CK-MB (CK-2)   1.11


 


Troponin I   0.014


 


NT-Pro-B Natriuret Pep   1290 H











Impressions: 


                                        





Chest X-Ray  03/24/20 19:59


IMPRESSION:


1. Hyperexpanded appearance is nonspecific but can be seen in the


setting of COPD.


2. Nodular appearing lesion projects over the right upper lobe.


Findings are nonspecific and attention on follow-up is


recommended.


 














Assessment and Plan





- Diagnosis


(1) Paroxysmal atrial fibrillation with rapid ventricular response


Is this a current diagnosis for this admission?: Yes   


Plan: 


Continue diltiazem 60 mg p.o. every 6 hours.  Lopressor dose increased to 50 mg 

p.o. every 8 hours


We will give some Ativan to see if we can help with patient's anxiety and 

ultimately help control patient's A. fib.  Weaning off diltiazem drip.  Will 

ambulate patient and if patient's heart rate bounces back into RVR on 

ambulation, I will start on digoxin load.


Chadsvasc 2 --> Eliquis started but held in anticipation of patient's dental s

urgery on Monday next week


Echocardiogram is unremarkable with normal ejection fraction, no evidence of 

mitral valve prolapse.











(2) Anxiety


Is this a current diagnosis for this admission?: Yes   


Plan: 


Buspirone.  Reassurance added Celexa.  We will try some Ativan today..








(3) GERD (gastroesophageal reflux disease)


Qualifiers: 


   Esophagitis presence: esophagitis presence not specified   Qualified Code(s):

K21.9 - Gastro-esophageal reflux disease without esophagitis   


Is this a current diagnosis for this admission?: Yes   


Plan: 


Protonix while inpatient.








(4) Nausea


Is this a current diagnosis for this admission?: Yes   


Plan: 


Patient has chronic nausea since she had procedure for ischemic colitis a while 

back.  Takes Zofran at home.  Will give Zofran IV and Reglan as needed.








- Time


Time Spent with patient: Less than 15 minutes

## 2020-03-28 VITALS — SYSTOLIC BLOOD PRESSURE: 132 MMHG | DIASTOLIC BLOOD PRESSURE: 74 MMHG

## 2020-03-28 RX ADMIN — BUSPIRONE HYDROCHLORIDE SCH MG: 10 TABLET ORAL at 05:24

## 2020-03-28 RX ADMIN — METOPROLOL TARTRATE SCH MG: 50 TABLET, FILM COATED ORAL at 05:24

## 2020-03-28 RX ADMIN — DILTIAZEM HYDROCHLORIDE SCH MG: 120 CAPSULE, COATED, EXTENDED RELEASE ORAL at 10:24

## 2020-03-28 RX ADMIN — DEXAMETHASONE SODIUM PHOSPHATE PRN MG: 10 INJECTION INTRAMUSCULAR; INTRAVENOUS at 10:24

## 2020-03-28 RX ADMIN — LORAZEPAM PRN MG: 1 TABLET ORAL at 07:41

## 2020-03-28 RX ADMIN — METOCLOPRAMIDE HYDROCHLORIDE PRN MG: 10 TABLET ORAL at 07:40

## 2020-03-28 RX ADMIN — CITALOPRAM HYDROBROMIDE SCH MG: 20 TABLET ORAL at 10:24

## 2020-03-28 NOTE — PDOC DISCHARGE SUMMARY
Impression





- Admit/DC Date/PCP


Admission Date/Primary Care Provider: 


  03/26/20 11:56





  BRETT RICHARDSON MD





Discharge Date: 03/28/20





- Discharge Diagnosis


(1) Anxiety


Is this a current diagnosis for this admission?: Yes   





(2) Paroxysmal atrial fibrillation with rapid ventricular response


Is this a current diagnosis for this admission?: Yes   





(3) GERD (gastroesophageal reflux disease)


Is this a current diagnosis for this admission?: Yes   





(4) Nausea


Is this a current diagnosis for this admission?: Yes   





- Additional Information


Resuscitation Status: Full Code


Referrals: 


AVRIL LIM [NO LOCAL MD] - Follow up as needed


Prescriptions: 


Buspirone HCl [Buspar 10 mg Tablet] 5 mg PO Q8 #90 tablet


Diltiazem HCl [Cardizem Cd 120 mg Capsule] 120 mg PO Q12 #60 cap.sr.24h


Citalopram Hydrobromide [Celexa 20 mg Tablet] 20 mg PO DAILY #30 tablet


Apixaban [Eliquis 5 mg Tablet] 5 mg PO BID #60 tablet


Digoxin [Lanoxin 0.125 mg Tablet] 0.125 mg PO DAILY #30 tablet


Metoprolol Tartrate [Lopressor 50 mg Tablet] 50 mg PO Q8 #90 tablet


Home Medications: 








Zolpidem Tartrate [Ambien 10 mg Tablet] 10 mg PO HSP PRN 03/04/13 


Cetirizine HCl [Zyrtec 10 mg Tablet] 10 mg PO DAILYP PRN 03/25/20 


Ondansetron [Zofran Odt 4 mg Tablet] 4 mg PO Q4HP PRN 03/25/20 


Pravastatin Sodium 20 mg PO QPM 03/25/20 


Rabeprazole Sodium [Aciphex] 20 mg PO QPMP PRN 03/25/20 


Apixaban [Eliquis 5 mg Tablet] 5 mg PO BID #60 tablet 03/28/20 


Buspirone HCl [Buspar 10 mg Tablet] 5 mg PO Q8 #90 tablet 03/28/20 


Citalopram Hydrobromide [Celexa 20 mg Tablet] 20 mg PO DAILY #30 tablet 03/28/20




Digoxin [Lanoxin 0.125 mg Tablet] 0.125 mg PO DAILY #30 tablet 03/28/20 


Diltiazem HCl [Cardizem Cd 120 mg Capsule] 120 mg PO Q12 #60 cap.sr.24h 03/28/20




Metoprolol Tartrate [Lopressor 50 mg Tablet] 50 mg PO Q8 #90 tablet 03/28/20 











History of Present Illiness


History of Present Illness: 


Patient complains of: Palpitations


History of Present Illness: 


YOANA VILLA is a 69 year old female with a past medical history of mitral valve

prolapse and intermittent tachycardia on atenolol.  She presents with 12 hours 

of palpitations prompting evaluation in the emergency department.  She is found 

to have sinus tachycardia in the 1 40-1 80 range, after adenosine 6 followed by 

12 she was placed on IV Cardizem.  And referred to the hospitalist for admis

kristie.  She adamantly denies recent change in medications, excessive caffeine, 

energy drinks, over-the-counter medications or abrupt medication withdrawal.  

She does admit to recent onset of panic attack first being 2 weeks ago prior to 

dental procedure.  She admits to panic this morning concern for repeat dental 

procedure and ongoing public health concerns.   She also has a history of 

thyroid nodules requiring drainage 2 years ago.  After receiving continuous IV 

Cardizem followed by IV labetalol and Lopressor without significant improvement 

she receives IV Ativan greatly improving her heart rate into the 90s.  She 

denies chest pain nausea vomiting, heat or cold intolerance.








Hospital Course


Hospital Course: 





(1) Paroxysmal atrial fibrillation with rapid ventricular response


Continue diltiazem 120 mg every 12 hours.  Lopressor dose increased to 50 mg 

p.o. every 8 hours


Weaned off diltiazem drip.  Patient was started on digoxin.


Chadsvasc 2 --> Eliquis started but should be held in anticipation of patient's 

dental surgery on Monday next week.  Patient is aware.


Echocardiogram is unremarkable with normal ejection fraction, no evidence of 

mitral valve prolapse.


Patient is currently in sinus rhythm.  Can be discharged if okay with 

cardiology.





(2) Anxiety


Buspirone.  Reassurance. added Celexa. 





(3) GERD (gastroesophageal reflux disease)





(4) Nausea





Patient has chronic nausea since she had procedure for ischemic colitis a while 

back.  Takes Zofran at home. 











Physical Exam


Vital Signs: 


                                        











Temp Pulse Resp BP Pulse Ox


 


 98.5 F   78   16   124/68   93 


 


 03/28/20 07:39  03/28/20 07:39  03/28/20 07:39  03/28/20 07:39  03/28/20 07:39








                                 Intake & Output











 03/27/20 03/28/20 03/29/20





 06:59 06:59 06:59


 


Intake Total 1265 1447 


 


Output Total 950 300 


 


Balance 315 1147 


 


Weight 154 lb 5.177 oz 143 lb 4.807 oz 











General appearance: PRESENT: no acute distress, cooperative


Head exam: PRESENT: atraumatic, normocephalic


Eye exam: PRESENT: EOMI, PERRLA


Ear exam: ABSENT: bleeding, drainage


Neck exam: ABSENT: meningismus


Respiratory exam: PRESENT: clear to auscultation geovanni.  ABSENT: accessory muscle 

use


Cardiovascular exam: PRESENT: RRR.  ABSENT: diastolic murmur


Pulses: PRESENT: normal carotid pulses, normal radial pulses


GI/Abdominal exam: PRESENT: normal bowel sounds, soft.  ABSENT: tenderness


Neurological exam: PRESENT: alert, awake, oriented to person, oriented to place,

oriented to time, oriented to situation





Results


Laboratory Results: 


                                        











WBC  9.3 10^3/uL (4.0-10.5)   03/24/20  19:53    


 


RBC  4.61 10^6/uL (3.72-5.28)   03/24/20  19:53    


 


Hgb  14.9 g/dL (12.0-15.5)   03/24/20  19:53    


 


Hct  43.6 % (36.0-47.0)   03/24/20  19:53    


 


MCV  95 fl (80-97)   03/24/20  19:53    


 


MCH  32.2 pg (27.0-33.4)   03/24/20  19:53    


 


MCHC  34.1 g/dL (32.0-36.0)   03/24/20  19:53    


 


RDW  14.1 % (11.5-14.0)  H  03/24/20  19:53    


 


Plt Count  363 10^3/uL (150-450)   03/24/20  19:53    


 


Lymph % (Auto)  34.4 % (13-45)   03/24/20  19:53    


 


Mono % (Auto)  6.9 % (3-13)   03/24/20  19:53    


 


Eos % (Auto)  1.4 % (0-6)   03/24/20  19:53    


 


Baso % (Auto)  0.5 % (0-2)   03/24/20  19:53    


 


Absolute Neuts (auto)  5.3 10^3/uL (1.7-8.2)   03/24/20  19:53    


 


Absolute Lymphs (auto)  3.2 10^3/uL (0.5-4.7)   03/24/20  19:53    


 


Absolute Monos (auto)  0.6 10^3/uL (0.1-1.4)   03/24/20  19:53    


 


Absolute Eos (auto)  0.1 10^3/uL (0.0-0.6)   03/24/20  19:53    


 


Absolute Basos (auto)  0.0 10^3/uL (0.0-0.2)   03/24/20  19:53    


 


Seg Neutrophils %  56.8 % (42-78)   03/24/20  19:53    


 


PT  12.7 SEC (11.4-15.4)   03/24/20  19:53    


 


INR  0.95   03/24/20  19:53    


 


Sodium  135.8 mmol/L (137-145)  L  03/24/20  19:53    


 


Potassium  4.1 mmol/L (3.6-5.0)   03/24/20  19:53    


 


Chloride  98 mmol/L ()   03/24/20  19:53    


 


Carbon Dioxide  25 mmol/L (22-30)   03/24/20  19:53    


 


Anion Gap  13  (5-19)   03/24/20  19:53    


 


BUN  7 mg/dL (7-20)   03/24/20  19:53    


 


Creatinine  0.72 mg/dL (0.52-1.25)   03/24/20  19:53    


 


Est GFR ( Amer)  > 60  (>60)   03/24/20  19:53    


 


Est GFR (MDRD) Non-Af  > 60  (>60)   03/24/20  19:53    


 


Glucose  137 mg/dL ()  H  03/24/20  19:53    


 


Calcium  10.1 mg/dL (8.4-10.2)   03/24/20  19:53    


 


Magnesium  2.1 mg/dL (1.6-2.3)   03/24/20  19:53    


 


Total Bilirubin  0.5 mg/dL (0.2-1.3)   03/24/20  19:53    


 


Direct Bilirubin  0.2 mg/dL (0.0-0.4)   03/24/20  19:53    


 


Neonat Total Bilirubin  Not Reportable   03/24/20  19:53    


 


Neonat Direct Bilirubin  Not Reportable   03/24/20  19:53    


 


Neonat Indirect Bili  Not Reportable   03/24/20  19:53    


 


AST  24 U/L (14-36)   03/24/20  19:53    


 


ALT  16 U/L (<35)   03/24/20  19:53    


 


Alkaline Phosphatase  79 U/L ()   03/24/20  19:53    


 


Creatine Kinase  59 U/L ()   03/24/20  19:53    


 


CK-MB (CK-2)  1.11 ng/mL (<4.55)   03/24/20  19:53    


 


Troponin I  0.014 ng/mL  03/24/20  19:53    


 


NT-Pro-B Natriuret Pep  1290 pg/mL (<125)  H  03/24/20  19:53    


 


Total Protein  8.0 g/dL (6.3-8.2)   03/24/20  19:53    


 


Albumin  4.6 g/dL (3.5-5.0)   03/24/20  19:53    


 


TSH  0.53 uIU/mL (0.47-4.68)   03/24/20  19:53    


 


Free T4  1.08 ng/dL (0.78-2.19)   03/24/20  19:53    


 


Free T3 pg/mL  5.24 pg/mL (2.77-5.27)   03/24/20  19:53    


 


Urine Color  STRAW   03/24/20  21:19    


 


Urine Appearance  CLEAR   03/24/20  21:19    


 


Urine pH  7.0  (5.0-9.0)   03/24/20  21:19    


 


Ur Specific Gravity  1.004   03/24/20  21:19    


 


Urine Protein  NEGATIVE mg/dL (NEGATIVE)   03/24/20  21:19    


 


Urine Glucose (UA)  NEGATIVE mg/dL (NEGATIVE)   03/24/20  21:19    


 


Urine Ketones  20 mg/dL (NEGATIVE)  H  03/24/20  21:19    


 


Urine Blood  NEGATIVE  (NEGATIVE)   03/24/20  21:19    


 


Urine Nitrite  NEGATIVE  (NEGATIVE)   03/24/20  21:19    


 


Urine Bilirubin  NEGATIVE  (NEGATIVE)   03/24/20  21:19    


 


Urine Urobilinogen  NEGATIVE mg/dL (<2.0)   03/24/20  21:19    


 


Ur Leukocyte Esterase  NEGATIVE  (NEGATIVE)   03/24/20  21:19    


 


Urine WBC (Auto)  1 /HPF  03/24/20  21:19    


 


U Hyaline Cast (Auto)  1 /LPF  03/24/20  21:19    


 


Squamous Epi Cells Auto  <1 /HPF  03/24/20  21:19    


 


Urine Mucus (Auto)  RARE /LPF  03/24/20  21:19    


 


Urine Ascorbic Acid  NEGATIVE  (NEGATIVE)   03/24/20  21:19    








                                        











  03/24/20





  19:53


 


CK-MB (CK-2)  1.11


 


Troponin I  0.014


 


NT-Pro-B Natriuret Pep  1290 H











Impressions: 


                                        





Chest X-Ray  03/24/20 19:59


IMPRESSION:


1. Hyperexpanded appearance is nonspecific but can be seen in the


setting of COPD.


2. Nodular appearing lesion projects over the right upper lobe.


Findings are nonspecific and attention on follow-up is


recommended.


 














Plan


Time Spent: Greater than 30 Minutes - 35 minutes





Stroke


Is this a Stroke Patient?: No





Acute Heart Failure





- **


Is this a Heart Failure Patient?: No

## 2025-06-13 NOTE — PDOC PROGRESS REPORT
Subjective


Progress Note for:: 03/25/20


Subjective:: 





Patient complains of palpitations and low nausea this morning.  She says her 

nausea is chronic and has been going on since she had an ischemic colitis a few 

years ago.  Also endorses some headache.  States that she often gets very 

anxious but has not been on any medication for it.  Says she worries a lot about

different things and he gets anxious and worked up.


Reason For Visit: 


TACHYCARDIA








Physical Exam


Vital Signs: 


                                        











Temp Pulse Resp BP Pulse Ox


 


 98.6 F   91   16   126/67 H  98 


 


 03/25/20 11:57  03/25/20 11:57  03/25/20 11:57  03/25/20 12:00  03/25/20 11:57








                                 Intake & Output











 03/24/20 03/25/20 03/26/20





 06:59 06:59 06:59


 


Intake Total  2557 68


 


Output Total  150 


 


Balance  2407 68


 


Weight  63.7 kg 











General appearance: PRESENT: no acute distress, cooperative


Neck exam: ABSENT: JVD


Respiratory exam: PRESENT: clear to auscultation geovanni, symmetrical, unlabored.  

ABSENT: tachypnea, wheezes


Cardiovascular exam: PRESENT: irregular rhythm, +S1, +S2, tachycardia


GI/Abdominal exam: PRESENT: soft.  ABSENT: rebound, rigid, tenderness


Neurological exam: PRESENT: alert, awake





Results


Laboratory Results: 


                                        





                                 03/24/20 19:53 





                                 03/24/20 19:53 





                                        











  03/24/20 03/24/20 03/24/20





  19:53 19:53 19:53


 


WBC  9.3  


 


RBC  4.61  


 


Hgb  14.9  


 


Hct  43.6  


 


MCV  95  


 


MCH  32.2  


 


MCHC  34.1  


 


RDW  14.1 H  


 


Plt Count  363  


 


Seg Neutrophils %  56.8  


 


Sodium   135.8 L 


 


Potassium   4.1 


 


Chloride   98 


 


Carbon Dioxide   25 


 


Anion Gap   13 


 


BUN   7 


 


Creatinine   0.72 


 


Est GFR ( Amer)   > 60 


 


Glucose   137 H 


 


Calcium   10.1 


 


Magnesium    2.1


 


Total Bilirubin   0.5 


 


AST   24 


 


Alkaline Phosphatase   79 


 


Total Protein   8.0 


 


Albumin   4.6 


 


TSH   


 


Free T4   


 


Free T3 pg/mL   


 


Urine Color   


 


Urine Appearance   


 


Urine pH   


 


Ur Specific Gravity   


 


Urine Protein   


 


Urine Glucose (UA)   


 


Urine Ketones   


 


Urine Blood   


 


Urine Nitrite   


 


Ur Leukocyte Esterase   


 


Urine WBC (Auto)   














  03/24/20 03/24/20 03/24/20





  19:53 19:53 21:19


 


WBC   


 


RBC   


 


Hgb   


 


Hct   


 


MCV   


 


MCH   


 


MCHC   


 


RDW   


 


Plt Count   


 


Seg Neutrophils %   


 


Sodium   


 


Potassium   


 


Chloride   


 


Carbon Dioxide   


 


Anion Gap   


 


BUN   


 


Creatinine   


 


Est GFR (African Amer)   


 


Glucose   


 


Calcium   


 


Magnesium   


 


Total Bilirubin   


 


AST   


 


Alkaline Phosphatase   


 


Total Protein   


 


Albumin   


 


TSH  0.53  


 


Free T4   1.08 


 


Free T3 pg/mL   5.24 


 


Urine Color    STRAW


 


Urine Appearance    CLEAR


 


Urine pH    7.0


 


Ur Specific Gravity    1.004


 


Urine Protein    NEGATIVE


 


Urine Glucose (UA)    NEGATIVE


 


Urine Ketones    20 H


 


Urine Blood    NEGATIVE


 


Urine Nitrite    NEGATIVE


 


Ur Leukocyte Esterase    NEGATIVE


 


Urine WBC (Auto)    1








                                        











  03/24/20 03/24/20





  19:53 19:53


 


Creatine Kinase  59 


 


CK-MB (CK-2)   1.11


 


Troponin I   0.014


 


NT-Pro-B Natriuret Pep   1290 H











Impressions: 


                                        





Chest X-Ray  03/24/20 19:59


IMPRESSION:


1. Hyperexpanded appearance is nonspecific but can be seen in the


setting of COPD.


2. Nodular appearing lesion projects over the right upper lobe.


Findings are nonspecific and attention on follow-up is


recommended.


 














Assessment and Plan





- Diagnosis


(1) Paroxysmal atrial fibrillation with rapid ventricular response


Is this a current diagnosis for this admission?: Yes   


Plan: 


Still in RVR on diltiazem drip.  Rate increased this morning.  Discontinue 

atenolol.  Restart diltiazem p.o. 60 mg every 6 hours and Lopressor 25 mg.  

Continue to monitor vital signs and monitor telemetry.  TSH and free T4 within 

normal limits- not in hyperthyroid state.


Chadsvasc 2 --> Eliquis started


Echocardiogram


Cardiology following








(2) Anxiety


Is this a current diagnosis for this admission?: Yes   


Plan: 


Starting on buspirone.








(3) GERD (gastroesophageal reflux disease)


Qualifiers: 


   Esophagitis presence: esophagitis presence not specified   Qualified Code(s):

K21.9 - Gastro-esophageal reflux disease without esophagitis   


Is this a current diagnosis for this admission?: Yes   


Plan: 


Protonix while inpatient.








- Time


Time Spent with patient: 15-24 minutes [Arrhythmia/ECG Abnorrmalities] : arrhythmia/ECG abnormalities [Other: ____] : [unfilled]